# Patient Record
Sex: FEMALE | Race: WHITE | Employment: OTHER | URBAN - METROPOLITAN AREA
[De-identification: names, ages, dates, MRNs, and addresses within clinical notes are randomized per-mention and may not be internally consistent; named-entity substitution may affect disease eponyms.]

---

## 2019-04-25 ENCOUNTER — INITIAL CONSULT (OUTPATIENT)
Dept: SURGERY | Age: 64
End: 2019-04-25

## 2019-04-25 VITALS
WEIGHT: 175 LBS | SYSTOLIC BLOOD PRESSURE: 142 MMHG | HEART RATE: 95 BPM | BODY MASS INDEX: 28.12 KG/M2 | OXYGEN SATURATION: 96 % | TEMPERATURE: 97.9 F | HEIGHT: 66 IN | DIASTOLIC BLOOD PRESSURE: 102 MMHG | RESPIRATION RATE: 18 BRPM

## 2019-04-25 DIAGNOSIS — H02.403 BLEPHAROPTOSIS, BILATERAL: Primary | ICD-10-CM

## 2019-04-25 PROCEDURE — MISCPX MISCPX: Performed by: PLASTIC SURGERY

## 2019-04-25 SDOH — HEALTH STABILITY: MENTAL HEALTH: HOW OFTEN DO YOU HAVE A DRINK CONTAINING ALCOHOL?: MONTHLY OR LESS

## 2019-04-25 NOTE — PROGRESS NOTES
Department of Plastic Surgery  Facial Consult Note          CHIEF COMPLAINT:  Upper eyelid ptosis    History Obtained From:  patient    HISTORY OF PRESENT ILLNESS:                The patient is a 61 y.o. female who presents with bilateral upper eyelid ptosis. The patient states that they have had concerns with their eyelids for several years. The patient states that the bilateral upper eyelids are  effecting their visual fields. She states that at times it feels as though they're looking through their eyelashes. They state they have difficulty reading due to superior visual field loss. The patient states they have  not had any occular infections related to their eyelid ptosis. Her next appointment with the with their opthomologist is next week. They do admit to having difficulty with lateral visual fields which includes difficulty seeing objects approaching from the periphery. They deny any blurred vision double vision. Past Medical History:    Past Medical History:   Diagnosis Date    Anxiety     Depression     Hyperlipidemia     Nausea & vomiting     Ulcer, stomach peptic      Past Surgical History:    Past Surgical History:   Procedure Laterality Date    APPENDECTOMY      BREAST SURGERY      x2 lumpectomies benign     SECTION  x2     COLONOSCOPY      HERNIA REPAIR      HYSTERECTOMY      HYSTERECTOMY      TONSILLECTOMY      UPPER GASTROINTESTINAL ENDOSCOPY      VENTRAL HERNIA REPAIR  2012    with removal abdominal wall lipoma     Current Medications:   No current facility-administered medications for this visit. Allergies:  Patient has no known allergies.     Social History:   Social History     Socioeconomic History    Marital status:      Spouse name: Not on file    Number of children: Not on file    Years of education: Not on file    Highest education level: Not on file   Occupational History    Not on file   Social Needs    Financial resource strain: Not on file    Food insecurity:     Worry: Not on file     Inability: Not on file    Transportation needs:     Medical: Not on file     Non-medical: Not on file   Tobacco Use    Smoking status: Current Every Day Smoker     Packs/day: 1.00     Years: 32.00     Pack years: 32.00    Smokeless tobacco: Never Used   Substance and Sexual Activity    Alcohol use: Yes     Alcohol/week: 3.6 oz     Types: 2 Glasses of wine, 2 Cans of beer, 2 Shots of liquor per week     Frequency: Monthly or less     Comment: socially either or    Drug use: No    Sexual activity: Not on file   Lifestyle    Physical activity:     Days per week: Not on file     Minutes per session: Not on file    Stress: Not on file   Relationships    Social connections:     Talks on phone: Not on file     Gets together: Not on file     Attends [employer] service: Not on file     Active member of club or organization: Not on file     Attends meetings of clubs or organizations: Not on file     Relationship status: Not on file    Intimate partner violence:     Fear of current or ex partner: Not on file     Emotionally abused: Not on file     Physically abused: Not on file     Forced sexual activity: Not on file   Other Topics Concern    Not on file   Social History Narrative    Not on file     Family History:   No family history on file. REVIEW OF SYSTEMS:    CONSTITUTIONAL:  negative for  fevers and chills  RESPIRATORY:  negative for  dry cough  CARDIOVASCULAR:  negative for  chest pain  GASTROINTESTINAL:  negative for abdominal pain. BEHAVIOR/PSYCH:  negative for agitated mood  All other review of systems negative    PHYSICAL EXAM:    VITALS:  BP (!) 142/102 (Site: Right Upper Arm, Position: Sitting, Cuff Size: Medium Adult)   Pulse 95   Temp 97.9 °F (36.6 °C) (Tympanic)   Resp 18   Ht 5' 6\" (1.676 m)   Wt 175 lb (79.4 kg)   SpO2 96%   Breastfeeding? No   BMI 28.25 kg/m²     Constitutional: She is oriented to person, place, and time.  She appears well-developed and well-nourished. HENT:  Negative. Head: Normocephalic and atraumatic. Right Ear: External ear normal.   Left Ear: External ear normal.   Nose: Nose normal.   Mouth/Throat: Oropharynx is clear and moist.     Neck: Normal range of motion. Neck supple. Cardiovascular: Normal rate, regular rhythm, normal heart sounds and intact distal pulses. No murmur heard. Pulmonary/Chest: Effort normal and breath sounds normal.   Abdominal: Soft. Bowel sounds are normal. She exhibits no mass. No tenderness. Musculoskeletal: Normal range of motion. She exhibits no edema. Lymphadenopathy: She has no cervical adenopathy. Neurological: She is alert and oriented to person, place, and time. She has normal and symmetric cranial nerves II - XII  Skin: Warm and dry. Eyes: Conjunctivae and extraocular motions are normal. Pupils are equal, round, and reactive to light. Upper eyelid- There is ptotic tissue redundancy of bilateral upper eyelids with tenting of the dermis  greater than normal upper eyelid edema which appears to physically stretch the dermis    DATA:    Radiology Review:  None    IMPRESSION/RECOMMENDATIONS:      The patient will benefit from    1) Bilateral upper eyelid blepharoptosis with blepharochalasis, visual field disturbance        The risks, benefits and options were discussed with the pt. The risks included but not limited to pain, bleeding, infection, heavy scarring, damage to surrounding structures, blindness, fluid collections, asymmetry, and need for further procedures. All of Her questions were answered to her satisfaction and She agrees to proceed with the operation. Educated the patient she will need to obtain a visual field exam at her eye doctor next week patient voices understanding and she will have these results faxed to our office. Face-to-face time greater than 25 minutes, greater than 50% in counseling, education, and coordination of care.      I attest that the patient was seen and examined by me, and concur with the documentation above. I agree with the assessment and the plan outlined. This document is generated, in part, by voice recognition software and thus  syntax and grammatical errors are possible.     Jero Khan  11:18 AM  4/26/2019

## 2019-06-04 ENCOUNTER — TELEPHONE (OUTPATIENT)
Dept: SURGERY | Age: 64
End: 2019-06-04

## 2019-06-11 ENCOUNTER — TELEPHONE (OUTPATIENT)
Dept: SURGERY | Age: 64
End: 2019-06-11

## 2019-06-11 NOTE — TELEPHONE ENCOUNTER
Patient called having cataract surgery first then will have vision field screen test and proceed from there on having upper eyelids done.

## 2019-10-03 ENCOUNTER — TELEPHONE (OUTPATIENT)
Dept: SURGERY | Age: 64
End: 2019-10-03

## 2019-10-13 ENCOUNTER — APPOINTMENT (OUTPATIENT)
Dept: GENERAL RADIOLOGY | Age: 64
End: 2019-10-13
Payer: COMMERCIAL

## 2019-10-13 ENCOUNTER — HOSPITAL ENCOUNTER (EMERGENCY)
Age: 64
Discharge: HOME | End: 2019-10-13
Payer: COMMERCIAL

## 2019-10-13 VITALS
BODY MASS INDEX: 25.9 KG/M2 | DIASTOLIC BLOOD PRESSURE: 85 MMHG | OXYGEN SATURATION: 96 % | HEART RATE: 93 BPM | SYSTOLIC BLOOD PRESSURE: 124 MMHG | TEMPERATURE: 97.9 F | RESPIRATION RATE: 20 BRPM | WEIGHT: 165 LBS | HEIGHT: 67 IN

## 2019-10-13 DIAGNOSIS — J40 BRONCHITIS: Primary | ICD-10-CM

## 2019-10-13 PROCEDURE — 71046 X-RAY EXAM CHEST 2 VIEWS: CPT

## 2019-10-13 PROCEDURE — 99212 OFFICE O/P EST SF 10 MIN: CPT

## 2019-10-13 RX ORDER — ALBUTEROL SULFATE 90 UG/1
2 AEROSOL, METERED RESPIRATORY (INHALATION) EVERY 6 HOURS PRN
Qty: 1 INHALER | Refills: 0 | Status: SHIPPED | OUTPATIENT
Start: 2019-10-13 | End: 2020-10-12

## 2019-10-13 RX ORDER — PREDNISONE 10 MG/1
40 TABLET ORAL DAILY
Qty: 20 TABLET | Refills: 0 | Status: SHIPPED | OUTPATIENT
Start: 2019-10-13 | End: 2019-10-18

## 2020-09-14 ENCOUNTER — HOSPITAL ENCOUNTER (OUTPATIENT)
Dept: MAMMOGRAPHY | Age: 65
Discharge: HOME | End: 2020-09-16
Payer: COMMERCIAL

## 2020-09-14 PROCEDURE — 77063 BREAST TOMOSYNTHESIS BI: CPT

## 2021-11-27 ENCOUNTER — HOSPITAL ENCOUNTER (EMERGENCY)
Age: 66
Discharge: HOME | End: 2021-11-27
Payer: MEDICARE

## 2021-11-27 VITALS
SYSTOLIC BLOOD PRESSURE: 142 MMHG | OXYGEN SATURATION: 97 % | BODY MASS INDEX: 30.21 KG/M2 | DIASTOLIC BLOOD PRESSURE: 94 MMHG | WEIGHT: 190 LBS | RESPIRATION RATE: 16 BRPM | HEART RATE: 85 BPM | TEMPERATURE: 97.5 F

## 2021-11-27 DIAGNOSIS — B02.9 HERPES ZOSTER WITHOUT COMPLICATION: Primary | ICD-10-CM

## 2021-11-27 PROCEDURE — 99211 OFF/OP EST MAY X REQ PHY/QHP: CPT

## 2021-11-27 RX ORDER — TRAMADOL HYDROCHLORIDE 50 MG/1
50 TABLET ORAL EVERY 6 HOURS PRN
COMMUNITY

## 2021-11-27 RX ORDER — ASPIRIN 81 MG/1
81 TABLET ORAL DAILY
COMMUNITY

## 2021-11-27 RX ORDER — VALACYCLOVIR HYDROCHLORIDE 1 G/1
1000 TABLET, FILM COATED ORAL 3 TIMES DAILY
Qty: 21 TABLET | Refills: 0 | Status: SHIPPED | OUTPATIENT
Start: 2021-11-27 | End: 2021-12-04

## 2021-11-27 RX ORDER — HYDROCODONE BITARTRATE AND ACETAMINOPHEN 5; 325 MG/1; MG/1
1 TABLET ORAL EVERY 6 HOURS PRN
Qty: 12 TABLET | Refills: 0 | Status: SHIPPED | OUTPATIENT
Start: 2021-11-27 | End: 2021-11-30

## 2021-11-27 ASSESSMENT — PAIN DESCRIPTION - FREQUENCY
FREQUENCY: INTERMITTENT
FREQUENCY: CONTINUOUS

## 2021-11-27 ASSESSMENT — PAIN DESCRIPTION - DESCRIPTORS: DESCRIPTORS: ACHING;SHOOTING;THROBBING;STABBING

## 2021-11-27 ASSESSMENT — PAIN - FUNCTIONAL ASSESSMENT
PAIN_FUNCTIONAL_ASSESSMENT: 0-10
PAIN_FUNCTIONAL_ASSESSMENT: ACTIVITIES ARE NOT PREVENTED

## 2021-11-27 ASSESSMENT — PAIN DESCRIPTION - ORIENTATION: ORIENTATION: RIGHT

## 2021-11-27 ASSESSMENT — PAIN DESCRIPTION - PAIN TYPE
TYPE: ACUTE PAIN
TYPE: ACUTE PAIN

## 2021-11-27 ASSESSMENT — PAIN SCALES - GENERAL
PAINLEVEL_OUTOF10: 5
PAINLEVEL_OUTOF10: 7

## 2021-11-27 ASSESSMENT — PAIN DESCRIPTION - ONSET: ONSET: ON-GOING

## 2021-11-27 ASSESSMENT — PAIN DESCRIPTION - LOCATION
LOCATION: GENERALIZED
LOCATION: HEAD;EAR

## 2021-11-27 ASSESSMENT — PAIN DESCRIPTION - PROGRESSION: CLINICAL_PROGRESSION: NOT CHANGED

## 2021-12-01 NOTE — ED PROVIDER NOTES
Department of Emergency [facility]  Provider Note  Admit Date/RoomTime: 2021  1:49 PM  Room:     NAME: Sandee Payne  : 1955  MRN: [id number]     Chief Complaint:  Rash (possible shingles, right side of forehead/face area, pain going behind her ear, rash came out about Monday)    History of Present Illness       Sandee Payne is a 77 y.o. old female with has a past medical history of:   Past Medical History:   Diagnosis Date    Anxiety     Depression     Hyperlipidemia     Nausea & vomiting     Ulcer, stomach peptic     presents to the urgent care center by private vehicle, for complaint of a painful rash on the right side of the forehead and behind the right ear. It started 5 days ago. ROS    Pertinent positives and negatives are stated within HPI, all other systems reviewed and are negative. Past Surgical History:   Procedure Laterality Date    APPENDECTOMY      BREAST SURGERY      x2 lumpectomies benign     SECTION  x2     COLONOSCOPY      HERNIA REPAIR      HYSTERECTOMY      HYSTERECTOMY      TONSILLECTOMY      UPPER GASTROINTESTINAL ENDOSCOPY      VENTRAL HERNIA REPAIR  2012    with removal abdominal wall lipoma   Social History:  reports that she has been smoking cigarettes. She has a 16.00 pack-year smoking history. She has never used smokeless tobacco. She reports current alcohol use of about 6.0 standard drinks of alcohol per week. She reports that she does not use drugs. Family History: family history is not on file. Allergies: Patient has no known allergies. Physical Exam           ED Triage Vitals [21 1327]   BP Temp Temp src Pulse Resp SpO2 Height Weight   (!) 142/94 97.5 °F (36.4 °C) -- 85 16 97 % -- 190 lb (86.2 kg)     Oxygen Saturation Interpretation: Normal.    Constitutional:  Alert, development consistent with age. HEENT:  NC/NT. Eyes:  PERRL, EOMI, no discharge.   Conjunctiva clear  Ears: TMs without perforation, injection, or bulging. External canals clear without exudate. No rash in the ear canals. Mouth:  Mucous membranes moist without lesions, tongue and gums normal.  Throat:  Pharynx without injection, exudate, or tonsillar hypertrophy. Airway patient. Neck:  Supple. Respiratory:  Clear to auscultation and breath sounds equal.  CV:  Regular rate and rhythm. GI:  Abdomen Soft, nontender, +BS. Integument:  Erythematous papular rash in a dermatomal pattern on the right forehead and behind the right ear. Neurological:  Orientation age-appropriate unless noted elseware. Motor functions intact. Lab / Imaging Results   (All laboratory and radiology results have been personally reviewed by myself)  Labs:  No results found for this visit on 11/27/21. Imaging: All Radiology results interpreted by Radiologist unless otherwise noted. No orders to display       ED Course / Medical Decision Making   Medications - No data to display           Assessment      1. Herpes zoster without complication      Plan   Discharge to home and advised to contact Kimberly Rosenbaum [facility] [address] [phone]    Schedule an appointment as soon as possible for a visit      Patient condition is good    New Medications     Discharge Medication List as of 11/27/2021  2:06 PM      START taking these medications    Details   valACYclovir (VALTREX) 1 g tablet Take 1 tablet by mouth 3 times daily for 7 days, Disp-21 tablet, R-0Normal      HYDROcodone-acetaminophen (NORCO) 5-325 MG per tablet Take 1 tablet by mouth every 6 hours as needed for Pain for up to 3 days. , Disp-12 tablet, R-0Normal           Electronically signed by GILBERTO Josue CNP   DD: 12/1/21  **This report was transcribed using voice recognition software. Every effort was made to ensure accuracy; however, inadvertent computerized transcription errors may be present.   END OF ED PROVIDER NOTE       Shazia Belcher Brody Lamb - CNP  12/01/21 [id number]

## 2021-12-15 ENCOUNTER — HOSPITAL ENCOUNTER (OUTPATIENT)
Dept: CT IMAGING | Age: 66
Discharge: HOME | End: 2021-12-17
Payer: MEDICARE

## 2021-12-15 DIAGNOSIS — M16.12 ARTHRITIS OF LEFT HIP: ICD-10-CM

## 2021-12-15 PROCEDURE — 73700 CT LOWER EXTREMITY W/O DYE: CPT

## 2023-10-19 ENCOUNTER — OFFICE VISIT (OUTPATIENT)
Dept: SURGERY | Age: 68
End: 2023-10-19
Payer: MEDICARE

## 2023-10-19 VITALS
HEIGHT: 67 IN | DIASTOLIC BLOOD PRESSURE: 89 MMHG | BODY MASS INDEX: 31.86 KG/M2 | SYSTOLIC BLOOD PRESSURE: 137 MMHG | WEIGHT: 203 LBS | OXYGEN SATURATION: 98 % | TEMPERATURE: 98.1 F | HEART RATE: 81 BPM

## 2023-10-19 DIAGNOSIS — L02.214 INGUINAL ABSCESS: Primary | ICD-10-CM

## 2023-10-19 PROCEDURE — G8427 DOCREV CUR MEDS BY ELIG CLIN: HCPCS | Performed by: SURGERY

## 2023-10-19 PROCEDURE — 4004F PT TOBACCO SCREEN RCVD TLK: CPT | Performed by: SURGERY

## 2023-10-19 PROCEDURE — G8400 PT W/DXA NO RESULTS DOC: HCPCS | Performed by: SURGERY

## 2023-10-19 PROCEDURE — 1090F PRES/ABSN URINE INCON ASSESS: CPT | Performed by: SURGERY

## 2023-10-19 PROCEDURE — 99203 OFFICE O/P NEW LOW 30 MIN: CPT | Performed by: SURGERY

## 2023-10-19 PROCEDURE — G8417 CALC BMI ABV UP PARAM F/U: HCPCS | Performed by: SURGERY

## 2023-10-19 PROCEDURE — 1123F ACP DISCUSS/DSCN MKR DOCD: CPT | Performed by: SURGERY

## 2023-10-19 PROCEDURE — G8484 FLU IMMUNIZE NO ADMIN: HCPCS | Performed by: SURGERY

## 2023-10-19 PROCEDURE — 3017F COLORECTAL CA SCREEN DOC REV: CPT | Performed by: SURGERY

## 2023-10-19 RX ORDER — CEFADROXIL 500 MG/1
500 CAPSULE ORAL 2 TIMES DAILY
COMMUNITY

## 2023-10-19 RX ORDER — CLINDAMYCIN PHOSPHATE 10 MG/G
GEL TOPICAL
Qty: 30 G | Refills: 1 | Status: SHIPPED | OUTPATIENT
Start: 2023-10-19 | End: 2023-10-26

## 2023-10-19 NOTE — PROGRESS NOTES
History and Physical - General Surgery    Patient's Name/Date of Birth: Russ Germain / 1955    Date: 10/19/2023    PCP: Rasheed Hook MD    Referring Physician:   Marcos Quinteros MD  [phone]      CHIEF COMPLAINT:    Chief Complaint   Patient presents with    New Patient     Boil of vulva         HISTORY OF PRESENT ILLNESS:    Russ Germain is an 76 y.o. female who presents with a nodule on her vulva. She said it has been infected twice. She said it was originally treated with antibiotics a few months ago. She said it came back very badly this week. It was drained by her gynecologist yesterday. She said she feels much better since it was lanced. She is currently on an antibiotic as well. She said last time when it healed she felt a little nodule there still. Past Medical History:   Past Medical History:   Diagnosis Date    Anxiety     Depression     Hyperlipidemia     Nausea & vomiting     Ulcer, stomach peptic         Past Surgical History:   Past Surgical History:   Procedure Laterality Date    APPENDECTOMY      BREAST SURGERY      x2 lumpectomies benign     SECTION  x2     COLONOSCOPY      HERNIA REPAIR      HIP SURGERY Left     hip replacement    HYSTERECTOMY (CERVIX STATUS UNKNOWN)      HYSTERECTOMY (CERVIX STATUS UNKNOWN)      TONSILLECTOMY      UPPER GASTROINTESTINAL ENDOSCOPY      VENTRAL HERNIA REPAIR  2012    with removal abdominal wall lipoma        Allergies: Patient has no known allergies. Medications:   Current Outpatient Medications   Medication Sig Dispense Refill    cefadroxil (DURICEF) 500 MG capsule Take 1 capsule by mouth 2 times daily      aspirin 81 MG EC tablet Take 1 tablet by mouth daily      Pantoprazole Sodium (PROTONIX PO) Take by mouth      LORazepam (ATIVAN) 2 MG tablet Take 1 tablet by mouth nightly. Citalopram Hydrobromide (CELEXA PO) Take 1 tablet by mouth daily. Rosuvastatin Calcium (CRESTOR PO) Take 1 tablet by mouth daily.

## 2023-10-22 LAB
CULTURE: ABNORMAL
DIRECT EXAM: ABNORMAL
Lab: ABNORMAL
SPECIMEN DESCRIPTION: ABNORMAL

## 2023-10-23 LAB
CULTURE: ABNORMAL
DIRECT EXAM: ABNORMAL
Lab: ABNORMAL
SPECIMEN DESCRIPTION: ABNORMAL

## 2023-11-16 ENCOUNTER — PROCEDURE VISIT (OUTPATIENT)
Dept: SURGERY | Age: 68
End: 2023-11-16

## 2023-11-16 VITALS
DIASTOLIC BLOOD PRESSURE: 88 MMHG | HEIGHT: 67 IN | HEART RATE: 87 BPM | SYSTOLIC BLOOD PRESSURE: 140 MMHG | WEIGHT: 200 LBS | TEMPERATURE: 97.7 F | BODY MASS INDEX: 31.39 KG/M2 | OXYGEN SATURATION: 96 %

## 2023-11-16 DIAGNOSIS — L98.9 SKIN LESION: Primary | ICD-10-CM

## 2023-11-16 NOTE — PROGRESS NOTES
Operative Note    Dulce Jacques     DATE OF PROCEDURE: 11/16/2023  SURGEON: Timothy Martínez MD FACS    PREOPERATIVE DIAGNOSIS: Inguinal skin lesion     POSTOPERATIVE DIAGNOSIS: same    OPERATION: Excision of right inguinal skin lesion     ANESTHESIA: Local anesthesia with 1% Lidocaine with epinephrine     ESTIMATED BLOOD LOSS: Minimal    SPECIMEN: right inguinal skin lesion, 2 cm x 1.5 cm    COMPLICATIONS: None    BRIEF HISTORY: Dulce Jacques is a 76 y.o.  female presenting with an inguinal skin lesion. I discussed the surgery with the patient including the procedure, benefits, risks and alternatives, and she agreed. PROCEDURE IN DETAIL: The patient lied on the procedure bed in the supine position. The area over the lesion was prepped with Chloraprep and draped in a sterile fashion. The tissue around the lesion was injected with local anesthesia. After marking an incision along Amanda's lines, the skin was opened with a 15-blade scalpel, and the incision was carried down through the dermis and subcutaneous tissue. The scalpel was used to excise the lesion from the surrounding tissue. It was placed in a specimen container to be sent to pathology for evaluation. There was good hemostasis. The skin was closed with 3-0 Vicryl running subcuticular suture. A sterile dressing of skin glue was placed. The patient tolerated the procedure well.      Fer Zelaya MD,MD, FACS 11/16/2023 12:06 PM     Send copy of H&P to PCP, Stephanie Rojas MD and referring physician, Tamar Morse MD

## 2023-11-22 LAB — SURGICAL PATHOLOGY REPORT: NORMAL

## 2024-01-29 RX ORDER — CEPHALEXIN 500 MG/1
500 CAPSULE ORAL 2 TIMES DAILY
Qty: 20 CAPSULE | Refills: 0 | Status: SHIPPED | OUTPATIENT
Start: 2024-01-29 | End: 2024-02-08

## 2024-03-09 ENCOUNTER — HOSPITAL ENCOUNTER (EMERGENCY)
Age: 69
Discharge: HOME | End: 2024-03-09
Payer: MEDICARE

## 2024-03-09 VITALS
SYSTOLIC BLOOD PRESSURE: 146 MMHG | BODY MASS INDEX: 31.48 KG/M2 | OXYGEN SATURATION: 100 % | HEART RATE: 98 BPM | TEMPERATURE: 98.7 F | WEIGHT: 198 LBS | DIASTOLIC BLOOD PRESSURE: 86 MMHG | RESPIRATION RATE: 20 BRPM

## 2024-03-09 DIAGNOSIS — N30.01 ACUTE CYSTITIS WITH HEMATURIA: Primary | ICD-10-CM

## 2024-03-09 LAB
BACTERIA URNS QL MICRO: ABNORMAL
BILIRUB UR QL STRIP: NEGATIVE
CLARITY UR: CLEAR
COLOR UR: YELLOW
GLUCOSE UR STRIP-MCNC: NEGATIVE MG/DL
HGB UR QL STRIP.AUTO: ABNORMAL
KETONES UR STRIP-MCNC: NEGATIVE MG/DL
LEUKOCYTE ESTERASE UR QL STRIP: NEGATIVE
NITRITE UR QL STRIP: NEGATIVE
PH UR STRIP: 5.5 [PH] (ref 5–9)
PROT UR STRIP-MCNC: NEGATIVE MG/DL
RBC #/AREA URNS HPF: ABNORMAL /HPF
SP GR UR STRIP: 1.02 (ref 1–1.03)
UROBILINOGEN UR STRIP-ACNC: 0.2 EU/DL (ref 0–1)
WBC #/AREA URNS HPF: ABNORMAL /HPF

## 2024-03-09 PROCEDURE — 87086 URINE CULTURE/COLONY COUNT: CPT

## 2024-03-09 PROCEDURE — 99211 OFF/OP EST MAY X REQ PHY/QHP: CPT

## 2024-03-09 PROCEDURE — 81001 URINALYSIS AUTO W/SCOPE: CPT

## 2024-03-09 RX ORDER — CEPHALEXIN 500 MG/1
500 CAPSULE ORAL 3 TIMES DAILY
Qty: 21 CAPSULE | Refills: 0 | Status: SHIPPED | OUTPATIENT
Start: 2024-03-09 | End: 2024-03-16

## 2024-03-09 ASSESSMENT — PAIN DESCRIPTION - DESCRIPTORS: DESCRIPTORS: PRESSURE;DISCOMFORT

## 2024-03-09 ASSESSMENT — PAIN DESCRIPTION - LOCATION: LOCATION: ABDOMEN

## 2024-03-09 ASSESSMENT — PAIN SCALES - GENERAL: PAINLEVEL_OUTOF10: 7

## 2024-03-09 ASSESSMENT — PAIN - FUNCTIONAL ASSESSMENT: PAIN_FUNCTIONAL_ASSESSMENT: 0-10

## 2024-03-09 NOTE — ED PROVIDER NOTES
[facility] URGENT CARE  EMERGENCY DEPARTMENT ENCOUNTER        NAME: Rozina Salmon  :  1955  MRN:  [id number]  Date of evaluation: 3/9/2024  Provider: Sea Brown PA-C  PCP: Gurpreet Gaffney MD  Note Started : 10:51 AM EST 3/9/24    Chief Complaint: Urinary Tract Infection (Abdominal pressure/burning with urination/states she ran a fever of 102 yesterday )      This is a 68-year-old female that presents to urgent care with urinary tract infection symptoms started a couple days ago.  She also complains of a fever.  There has been some frequency and burning with urination and some bladder discomfort.  On first contact patient she appears to be in no acute distress.        Review of Systems  Pertinent positives and negatives are stated within HPI, all other systems reviewed and are negative.     Allergies: Patient has no known allergies.     --------------------------------------------- PAST HISTORY ---------------------------------------------  Past Medical History:  has a past medical history of Anxiety, Depression, Hyperlipidemia, Nausea & vomiting, and Ulcer, stomach peptic.    Past Surgical History:  has a past surgical history that includes  section (x2 ); Breast surgery; Hysterectomy; Appendectomy; Tonsillectomy; Upper gastrointestinal endoscopy; Colonoscopy; ventral hernia repair (2012); hernia repair; Hysterectomy; hip surgery (Left, ); and knee surgery (Right).    Social History:  reports that she has been smoking cigarettes. She has a 16.0 pack-year smoking history. She has never used smokeless tobacco. She reports current alcohol use of about 6.0 standard drinks of alcohol per week. She reports that she does not use drugs.    Family History: family history is not on file.     The patient’s home medications have been reviewed.    The nursing notes within the ED encounter have been reviewed.  hematuria  Diagnosis management comments: Patient is having urinary symptoms for the last couple days.  Even fever.  But no fever here today no CVA tenderness.  Does have some suprapubic discomfort.  Urinalysis does show urinary tract infection findings.  Obtain urine culture placed on Keflex.  Instructions given.         DISCHARGE MEDICATIONS:  New Prescriptions    CEPHALEXIN (KEFLEX) 500 MG CAPSULE    Take 1 capsule by mouth 3 times daily for 7 days       DISCONTINUED MEDICATIONS:  Discontinued Medications    CEFADROXIL (DURICEF) 500 MG CAPSULE    Take 1 capsule by mouth 2 times daily       PATIENT REFERRED TO:  Gurpreet Gaffney MD  [address] [facility] [address]  [phone]    Schedule an appointment as soon as possible for a visit         --------------------------------- ADDITIONAL PROVIDER NOTES ---------------------------------  I have spoken with the patient and discussed today’s results, in addition to providing specific details for the plan of care and counseling regarding the diagnosis and prognosis.  Their questions are answered at this time and they are agreeable with the plan.   This patient is stable for discharge.  I have shared the specific conditions for return, as well as the importance of follow-up.      * NOTE: (Please note that portions of this note were completed with a voice recognition program.  Efforts were made to edit the dictations but occasionally words are mis-transcribed.)    --------------------------------- IMPRESSION AND DISPOSITION ---------------------------------    IMPRESSION  1. Acute cystitis with hematuria        DISPOSITION Decision To Discharge 03/09/2024 11:31:21 AM    Disposition: Discharge to home  Patient condition is good    I am the Primary Clinician of Record.     Sea Brown PA-C (electronically signed) on 3/9/2024 at 11:32 AM         Sea Brown PA-C  03/09/24 1134

## 2024-03-11 LAB
MICROORGANISM SPEC CULT: ABNORMAL
MICROORGANISM SPEC CULT: ABNORMAL
SPECIMEN DESCRIPTION: ABNORMAL

## 2024-08-20 ENCOUNTER — APPOINTMENT (OUTPATIENT)
Dept: GENERAL RADIOLOGY | Age: 69
End: 2024-08-20
Payer: MEDICARE

## 2024-08-20 ENCOUNTER — HOSPITAL ENCOUNTER (EMERGENCY)
Age: 69
Discharge: HOME | End: 2024-08-20
Payer: MEDICARE

## 2024-08-20 VITALS
RESPIRATION RATE: 20 BRPM | BODY MASS INDEX: 31.8 KG/M2 | TEMPERATURE: 98.4 F | OXYGEN SATURATION: 96 % | HEART RATE: 84 BPM | WEIGHT: 200 LBS | SYSTOLIC BLOOD PRESSURE: 151 MMHG | DIASTOLIC BLOOD PRESSURE: 95 MMHG

## 2024-08-20 DIAGNOSIS — J06.9 URI WITH COUGH AND CONGESTION: Primary | ICD-10-CM

## 2024-08-20 LAB
SARS-COV-2 RDRP RESP QL NAA+PROBE: NOT DETECTED
SPECIMEN DESCRIPTION: NORMAL

## 2024-08-20 PROCEDURE — 87635 SARS-COV-2 COVID-19 AMP PRB: CPT

## 2024-08-20 PROCEDURE — 99211 OFF/OP EST MAY X REQ PHY/QHP: CPT

## 2024-08-20 PROCEDURE — 71046 X-RAY EXAM CHEST 2 VIEWS: CPT

## 2024-08-20 RX ORDER — BENZONATATE 200 MG/1
200 CAPSULE ORAL 3 TIMES DAILY PRN
Qty: 15 CAPSULE | Refills: 0 | Status: SHIPPED | OUTPATIENT
Start: 2024-08-20

## 2024-08-20 RX ORDER — ALBUTEROL SULFATE 90 UG/1
2 AEROSOL, METERED RESPIRATORY (INHALATION) 4 TIMES DAILY PRN
Qty: 1 EACH | Refills: 0 | Status: SHIPPED | OUTPATIENT
Start: 2024-08-20

## 2024-08-20 RX ORDER — PREDNISONE 20 MG/1
20 TABLET ORAL DAILY
Qty: 5 TABLET | Refills: 0 | Status: SHIPPED | OUTPATIENT
Start: 2024-08-20 | End: 2024-08-25

## 2024-08-20 ASSESSMENT — PAIN - FUNCTIONAL ASSESSMENT: PAIN_FUNCTIONAL_ASSESSMENT: NONE - DENIES PAIN

## 2024-08-20 NOTE — ED PROVIDER NOTES
[facility] URGENT CARE  EMERGENCY DEPARTMENT ENCOUNTER        NAME: Rozina Salmon  :  1955  MRN:  [id number]  Date of evaluation: 2024  Provider: Sea Brown PA-C  PCP: Gurpreet Gaffney MD  Note Started : 1:15 PM EDT 24    Chief Complaint: Cough (Cold like sx for over 2 weeks. Coughing, weak, fatigue. Pt stated she did take zpak. At home covid test was neg. )      This is a 69-year-old female who presents to urgent care complaining of a several week history of sinus URI cough and cold symptoms.  [place] had been out of the country [place].  Took a Zithromax antibiotic still not feeling better.  On first contact patient she appears to be in no acute distress.        Review of Systems  Pertinent positives and negatives are stated within HPI, all other systems reviewed and are negative.     Allergies: Patient has no known allergies.     --------------------------------------------- PAST HISTORY ---------------------------------------------  Past Medical History:  has a past medical history of Anxiety, Depression, Hyperlipidemia, Nausea & vomiting, and Ulcer, stomach peptic.    Past Surgical History:  has a past surgical history that includes  section (x2 ); Breast surgery; Hysterectomy; Appendectomy; Tonsillectomy; Upper gastrointestinal endoscopy; Colonoscopy; ventral hernia repair (2012); hernia repair; Hysterectomy; hip surgery (Left, ); and knee surgery (Right).    Social History:  reports that she has been smoking cigarettes. She has a 16 pack-year smoking history. She has never used smokeless tobacco. She reports current alcohol use of about 6.0 standard drinks of alcohol per week. She reports that she does not use drugs.    Family History: family history is not on file.     The patient’s home medications have been reviewed.    The nursing notes within the ED encounter have been reviewed.

## 2024-09-11 DIAGNOSIS — J06.9 URI WITH COUGH AND CONGESTION: ICD-10-CM

## 2024-09-11 RX ORDER — ALBUTEROL SULFATE 90 UG/1
INHALANT RESPIRATORY (INHALATION)
Qty: 6.7 G | OUTPATIENT
Start: 2024-09-11